# Patient Record
Sex: MALE | Race: ASIAN | NOT HISPANIC OR LATINO | URBAN - METROPOLITAN AREA
[De-identification: names, ages, dates, MRNs, and addresses within clinical notes are randomized per-mention and may not be internally consistent; named-entity substitution may affect disease eponyms.]

---

## 2019-03-17 ENCOUNTER — EMERGENCY (EMERGENCY)
Facility: HOSPITAL | Age: 19
LOS: 1 days | Discharge: HOME | End: 2019-03-17
Attending: EMERGENCY MEDICINE | Admitting: EMERGENCY MEDICINE
Payer: MEDICAID

## 2019-03-17 VITALS
HEART RATE: 93 BPM | SYSTOLIC BLOOD PRESSURE: 109 MMHG | OXYGEN SATURATION: 96 % | RESPIRATION RATE: 18 BRPM | TEMPERATURE: 98 F | DIASTOLIC BLOOD PRESSURE: 66 MMHG

## 2019-03-17 LAB — OB PNL STL: POSITIVE — SIGNIFICANT CHANGE UP

## 2019-03-17 PROCEDURE — 99283 EMERGENCY DEPT VISIT LOW MDM: CPT

## 2019-03-17 NOTE — ED PROVIDER NOTE - ATTENDING CONTRIBUTION TO CARE
17 yo M presents with 3 episodes of noticing reddish discoloration of toilet bowl water. once 2 days ago. then twice today. no bright red blood on stool or mixed with stool. no meelena. no majenta colored stool. normal stoool. no abdominal pain. no consipation or diarrhea. no blood thinners.   no f/ch, cp, sob, lightheadedness, N/V  PE abd soft and NT, ND. rectal exam performed by Dr. Thompson, no hemorrhoids or fissure noticed. no active bleeding. brown stool in rectal exam. 19 yo M presents with 3 episodes of noticing reddish discoloration of toilet bowl water. once 2 days ago. then twice today. no bright red blood on stool or mixed with stool. no meelena. no majenta colored stool. normal stoool. no abdominal pain. no consipation or diarrhea. no blood thinners.   no f/ch, cp, sob, lightheadedness, N/V  PE abd soft and NT, ND. rectal exam performed by Dr. Thompson, no hemorrhoids or fissure noticed. no active bleeding. brown stool in rectal exam.  Normal vital signs, no signs or symptoms concerning for anemia. no abd pain or tenderness to palpation  And therefore not concern for acute intra-abdominal pathology at this time. Reported blood only in toilet bowl water. Patient will need G.I. follow-up. No  emergent workup indicated  at this time. Patient was discharged with instructions to  Eat high fiber diet, stool softener's, and follow up with GI in 1-2 days for repeat evaluation and further management.    I reviewed all results from this ED visit, and discussed ALL results with the patient and/or family, including all abnormal results and incidental findings. All questions/concerns were addressed, and I recommended appropriate follow up for all findings. All discharge instructions were thoroughly discussed with the patient and/or family, as well as important warning signs and new/ worsening symptoms which should necessitate patient's immediate return to the ED. The patient expressed understanding of all results discussed and follow up instructions given.The patient was agreeable with discharge and was discharged from the ED in stable condition.

## 2019-03-17 NOTE — ED PROVIDER NOTE - NSFOLLOWUPINSTRUCTIONS_ED_ALL_ED_FT
1) Please follow-up with your primary care doctor in the next 2-3 days.  Please call tomorrow for an appointment.  If you cannot follow-up with your primary care doctor please return to the ED for any urgent issues. Please follow up with a GI docotor in 3-6 days.  2) You were given a copy of the tests performed today.  Please bring the results with you and review them with your primary care doctor.  3) If you have any worsening of symptoms or any other concerns please return to the ED immediately. Such as but not limited to increase bleeding, chest pain, difficulty bleeding.  4) Please continue taking your home medications as directed.

## 2019-03-17 NOTE — ED PROVIDER NOTE - CLINICAL SUMMARY MEDICAL DECISION MAKING FREE TEXT BOX
Jay Meier MD PGY1: 17 yo M no PMH p/w 1 episode bright red rectal bleeding 2 days ago and 2 episodes today. internal hemorrhoid. Less likely diverticulosis bleed. occult stool test f/u with GI

## 2019-03-17 NOTE — ED PROVIDER NOTE - OBJECTIVE STATEMENT
Jay Meier MD PGY1: 17 yo M no PMH p/w 1 episode bright red rectal bleeding 2 days ago and 2 episodes today. Pt state that the blood is in toilet water. Pt also report discomfort In rectum when defecating. Pt states that he felt light head today after episode but felt better after drink water and eating. Denies fever chill, cp, sob, abd pain, black stools constipation. Jay Meier MD PGY1: 19 yo M no PMH p/w 1 episode bright red blood in toilet bowl 2 days ago and 2 episodes today. Pt state that the blood is in toilet water only. Pt also report discomfort In rectum when defecating. Pt states that he felt light head today after episode but felt better after drink water and eating. Denies fever chill, cp, sob, abd pain, black stools constipation.

## 2019-03-17 NOTE — ED PROVIDER NOTE - PHYSICAL EXAMINATION
Gen: AAOx3, non-toxic  Head: NCAT  HEENT: EOMI, PERRLA, oral mucosa moist, normal conjunctiva  Lung: CTAB, no respiratory distress, no wheezes/rhonchi/rales B/L, speaking in full sentences  CV: RRR, no murmurs, rubs or gallops  Abd: soft, NTND, no guarding, no CVA tenderness, no rebound tenderness. Rectal exam: no blood seen, no external hemorrhoids seen on exam, no masses felt.  MSK: no visible deformities, full range of motion of all 4 exts  Neuro: No focal sensory or motor deficits  Skin: Warm, well perfused, no rash  Psych: normal affect.   ~Jay Meier MD (PGY1)

## 2019-03-17 NOTE — ED PROVIDER NOTE - NSFOLLOWUPCLINICS_GEN_ALL_ED_FT
[facility]  Gastroenterology  [address] [place] [id number]  Phone: [phone]  Fax:   Follow Up Time:

## 2019-03-17 NOTE — ED ADULT TRIAGE NOTE - CHIEF COMPLAINT QUOTE
Pt c/o 3 episodes of rectal bleeding in past 3 days and rectal discomfort. Denies abd pain, dizziness, lightheadedness, SOB. Denies pmhx.

## 2019-03-17 NOTE — ED PROVIDER NOTE - NS ED ROS FT
GENERAL: No fever or chills, EYES: no change in vision, HEENT: no trouble speaking, CARDIAC: no chest pain, palpitation PULMONARY: no cough or SOB, GI: no abdominal pain, no nausea, no vomiting, no diarrhea or constipation, : No changes in urination, SKIN: no rashes, NEURO: no headache,  MSK: No muscle pain ~Jay Meier MD (PGY 1)

## 2024-10-17 ENCOUNTER — EMERGENCY (EMERGENCY)
Facility: HOSPITAL | Age: 24
LOS: 1 days | Discharge: HOME | End: 2024-10-17
Attending: EMERGENCY MEDICINE
Payer: COMMERCIAL

## 2024-10-17 VITALS
RESPIRATION RATE: 16 BRPM | SYSTOLIC BLOOD PRESSURE: 120 MMHG | HEART RATE: 95 BPM | WEIGHT: 100.09 LBS | TEMPERATURE: 98 F | OXYGEN SATURATION: 97 % | DIASTOLIC BLOOD PRESSURE: 79 MMHG | HEIGHT: 62 IN

## 2024-10-17 PROCEDURE — 99284 EMERGENCY DEPT VISIT MOD MDM: CPT

## 2024-10-17 PROCEDURE — 99283 EMERGENCY DEPT VISIT LOW MDM: CPT

## 2024-10-17 RX ORDER — CYCLOBENZAPRINE HCL 10 MG
10 TABLET ORAL ONCE
Refills: 0 | Status: COMPLETED | OUTPATIENT
Start: 2024-10-17 | End: 2024-10-17

## 2024-10-17 RX ORDER — ACETAMINOPHEN 325 MG
650 TABLET ORAL ONCE
Refills: 0 | Status: COMPLETED | OUTPATIENT
Start: 2024-10-17 | End: 2024-10-17

## 2024-10-17 RX ORDER — LIDOCAINE 50 MG/G
1 CREAM TOPICAL ONCE
Refills: 0 | Status: COMPLETED | OUTPATIENT
Start: 2024-10-17 | End: 2024-10-17

## 2024-10-17 RX ADMIN — Medication 600 MILLIGRAM(S): at 17:44

## 2024-10-17 RX ADMIN — Medication 10 MILLIGRAM(S): at 17:44

## 2024-10-17 RX ADMIN — Medication 650 MILLIGRAM(S): at 17:44

## 2024-10-17 RX ADMIN — LIDOCAINE 1 PATCH: 50 CREAM TOPICAL at 17:44
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
## 2024-10-17 NOTE — ED ADULT NURSE NOTE - NS ED PATIENT SAFETY CONCERN
Spoke with patient about smoking cessation related to COPD diagnosis. Pt understands but is not interesting in quitting smoking at this time.      Zita Godwin RN  10/17/20 2047    
Spoke with spouse Catrina over the phone with permission from patient about discharge and follow up with primary care. Wife understands and agrees with discharge plan     Zita Godwin RN  10/17/20 [id number]    
No

## 2024-10-17 NOTE — ED PROVIDER NOTE - PATIENT PORTAL LINK FT
You can access the FollowMyHealth Patient Portal offered by [facility] by registering at the following website: [email or web address]. By joining [facility]’s FollowMyHealth portal, you will also be able to view your health information using other applications (apps) compatible with our system.

## 2024-10-17 NOTE — ED PROVIDER NOTE - PROGRESS NOTE DETAILS
Statement Selected Pain improved. Return precautions and plan for pain control discussed. Ready for DC
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
## 2024-10-17 NOTE — ED ADULT NURSE NOTE - NSFALLUNIVINTERV_ED_ALL_ED
Bed/Stretcher in lowest position, wheels locked, appropriate side rails in place/Call bell, personal items and telephone in reach/Instruct patient to call for assistance before getting out of bed/chair/stretcher/Non-slip footwear applied when patient is off stretcher/Rayne to call system/Physically safe environment - no spills, clutter or unnecessary equipment/Purposeful proactive rounding/Room/bathroom lighting operational, light cord in reach
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
## 2024-10-17 NOTE — ED PROVIDER NOTE - PHYSICAL EXAMINATION
GEN: NAD, awake, eyes open spontaneously  EYES: normal conjunctiva, perrl, EOMI   ENT: NCAT, MMM, Trachea midline  NECK: No midline cervical spine tenderness. + Bilateral paracervical muscle and trapezius tenderness.   CHEST/LUNGS: Non-tachypneic, CTAB, bilateral breath sounds. No chest wall or clavicular tenderness. No seatbelt sign.   CARDIAC: Non-tachycardic, normal perfusion  ABDOMEN: Soft, NTND, No rebound/guarding  MSK: No edema, no gross deformity of extremities, no focal tenderness of long bones   BACK: No midline thoracic or lumbar tenderness to palpation. Bilateral lumbar paraspinal tenderness with palpable muscle hypertonicity.   SKIN: No rashes, no petechiae, no vesicles  NEURO: CN grossly intact, no focal motor or sensory deficits, Ambulatory without ataxia. No aphasia.   PSYCH: Alert, appropriate, cooperative, with capacity and insight
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
## 2024-10-22 PROBLEM — Z78.9 OTHER SPECIFIED HEALTH STATUS: Chronic | Status: ACTIVE | Noted: 2019-03-17
